# Patient Record
(demographics unavailable — no encounter records)

---

## 2025-02-20 NOTE — ASSESSMENT
[FreeTextEntry1] : Assessment:  This is a 34-year-old male with no significant medical history presenting with excessive snoring, restless sleep, and excessive daytime sleepiness (ESS 12/24). His STOP-BANG score of 3 suggests a moderate risk for obstructive sleep apnea (JUANA)  Plan:  Diagnostic Testing: Ordered a WatchPAT home sleep apnea test to assess for JUANA. This wrist-worn device will evaluate peripheral arterial tone, oxygen saturation, heart rate, and body movements to determine the presence and severity of sleep-disordered breathing. Results will guide further management. Treatment Recommendations (pending WatchPAT results): If mild to moderate JUANA is confirmed: Initiate trial of Bongo Rx therapy, an FDA-cleared, non-invasive expiratory positive airway pressure (EPAP) device for mild to moderate JUANA. This is a suitable initial option given the patient's symptoms and preferences. If bongo therapy does not control symptoms, cpap will help improve apnea.

## 2025-02-20 NOTE — PHYSICAL EXAM
[Low Lying Soft Palate] : low lying soft palate [Enlarged Base of the Tongue] : enlargement of the base of the tongue [II] : II [General Appearance - Well Developed] : well developed [General Appearance - Well Nourished] : well nourished [Heart Sounds] : normal S1 and S2 [Murmurs] : no murmurs [] : no respiratory distress [Auscultation Breath Sounds / Voice Sounds] : lungs were clear to auscultation bilaterally [No Focal Deficits] : no focal deficits [Oriented To Time, Place, And Person] : oriented to person, place, and time [Memory Recent] : recent memory was not impaired

## 2025-02-20 NOTE — HISTORY OF PRESENT ILLNESS
[FreeTextEntry1] : no PMD 34 year old man with no medical history is here in the sleep center to address excessive snoring and restless sleep.  Patient is sleepy with [place] sleepiness score of 12.  Patient has very loud snoring which disturbs his wife, does not have any witnessed apneas.  Patient's bedtime is around 10 PM wakes up in the morning around 6.30 AM.   He feels tired when he wakes up.  Patient drinks 2 cups of coffee during the daytime. Patient does not have any headaches or nocturia. He is not sleepy while driving. STOPBANG score - 3 neck size - 16 inches